# Patient Record
Sex: MALE | Race: WHITE | Employment: PART TIME | URBAN - METROPOLITAN AREA
[De-identification: names, ages, dates, MRNs, and addresses within clinical notes are randomized per-mention and may not be internally consistent; named-entity substitution may affect disease eponyms.]

---

## 2023-06-19 ENCOUNTER — TELEPHONE (OUTPATIENT)
Age: 23
End: 2023-06-19

## 2024-09-04 ENCOUNTER — OFFICE VISIT (OUTPATIENT)
Age: 24
End: 2024-09-04

## 2024-09-04 VITALS
TEMPERATURE: 98.8 F | OXYGEN SATURATION: 99 % | HEART RATE: 71 BPM | WEIGHT: 232 LBS | SYSTOLIC BLOOD PRESSURE: 128 MMHG | BODY MASS INDEX: 32.36 KG/M2 | DIASTOLIC BLOOD PRESSURE: 75 MMHG

## 2024-09-04 DIAGNOSIS — A69.20 ERYTHEMA MIGRANS (LYME DISEASE): Primary | ICD-10-CM

## 2024-09-04 RX ORDER — DOXYCYCLINE HYCLATE 100 MG
100 TABLET ORAL 2 TIMES DAILY
Qty: 20 TABLET | Refills: 0 | Status: SHIPPED | OUTPATIENT
Start: 2024-09-04 | End: 2024-09-14

## 2024-09-04 NOTE — PROGRESS NOTES
Robert Webb (:  2000) is a 24 y.o. male,Established patient, here for evaluation of the following chief complaint(s):  Insect Bite (Insect bite to Left abdomen 10 days ago, red and warm to touch)      Assessment & Plan :  Visit Diagnoses and Associated Orders       Erythema migrans (Lyme disease)    -  Primary    doxycycline hyclate (VIBRA-TABS) 100 MG tablet [2625]                 Patient was seen today for a rash to the left side of his stomach which is consistent with erythema migrans  Due to his exposure to ticks in the past and the appearance of the rash, will treat with doxycycline  Please take twice daily for 10 days  No testing is necessary at this time, I would recommend bringing this up the next time you see your primary care provider and see if they would like to test you for Lyme disease or other illnesses  Please return if symptoms persist or worsen         Subjective :    Insect Bite       24 y.o. male presents with symptoms of rash to his left abdomen which has persisted for the past 10 days.  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  States that he frequently pulls ticks off of him, but denies removing any recently that he can recall.  He is concerned for Lyme disease and other tickborne illnesses given his history of tick bites.  States that this rash does appear like a bull's-eye, it was more pronounced over the past couple days and is slightly better today.  He denies fevers, chills, body aches or fatigue.  He does report some mild headaches and nausea.  Denies drug allergies.  He does work in the sun frequently.         Vitals:    24 0850   BP: 128/75   Site: Right Upper Arm   Position: Sitting   Cuff Size: Medium Adult   Pulse: 71   Temp: 98.8 °F (37.1 °C)   SpO2: 99%   Weight: 105.2 kg (232 lb)       No results found for this visit on 24.      Objective   Physical Exam  Vitals and nursing note reviewed.   Constitutional:       General: He

## 2024-09-04 NOTE — PATIENT INSTRUCTIONS
Patient was seen today for a rash to the left side of his stomach which is consistent with erythema migrans  Due to his exposure to ticks in the past and the appearance of the rash, will treat with doxycycline  Please take twice daily for 10 days  No testing is necessary at this time, I would recommend bringing this up the next time you see your primary care provider and see if they would like to test you for Lyme disease or other illnesses  Please return if symptoms persist or worsen